# Patient Record
Sex: FEMALE | Race: BLACK OR AFRICAN AMERICAN | Employment: FULL TIME | URBAN - METROPOLITAN AREA
[De-identification: names, ages, dates, MRNs, and addresses within clinical notes are randomized per-mention and may not be internally consistent; named-entity substitution may affect disease eponyms.]

---
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
## 2022-10-22 ENCOUNTER — APPOINTMENT (OUTPATIENT)
Dept: GENERAL RADIOLOGY | Age: 22
End: 2022-10-22
Attending: EMERGENCY MEDICINE
Payer: COMMERCIAL

## 2022-10-22 ENCOUNTER — HOSPITAL ENCOUNTER (EMERGENCY)
Age: 22
Discharge: HOME | End: 2022-10-22
Attending: EMERGENCY MEDICINE
Payer: COMMERCIAL

## 2022-10-22 VITALS
SYSTOLIC BLOOD PRESSURE: 125 MMHG | BODY MASS INDEX: 23.11 KG/M2 | DIASTOLIC BLOOD PRESSURE: 89 MMHG | HEART RATE: 104 BPM | RESPIRATION RATE: 19 BRPM | HEIGHT: 69 IN | WEIGHT: 156 LBS | OXYGEN SATURATION: 100 % | TEMPERATURE: 98.8 F
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
## 2022-10-22 DIAGNOSIS — S82.832A OTHER CLOSED FRACTURE OF DISTAL END OF LEFT FIBULA, INITIAL ENCOUNTER: Primary | ICD-10-CM

## 2022-10-22 DIAGNOSIS — S82.892A CLOSED FRACTURE OF LEFT ANKLE, INITIAL ENCOUNTER: ICD-10-CM

## 2022-10-22 PROCEDURE — 99284 EMERGENCY DEPT VISIT MOD MDM: CPT

## 2022-10-22 PROCEDURE — 73610 X-RAY EXAM OF ANKLE: CPT

## 2022-10-22 PROCEDURE — 75810000275 HC EMERGENCY DEPT VISIT NO LEVEL OF CARE

## 2022-10-22 PROCEDURE — 75810000053 HC SPLINT APPLICATION

## 2022-10-22 PROCEDURE — 74011250637 HC RX REV CODE- 250/637: Performed by: EMERGENCY MEDICINE

## 2022-10-22 RX ORDER — OXYCODONE HYDROCHLORIDE 5 MG/1
5 TABLET ORAL
Qty: 10 TABLET | Refills: 0 | Status: SHIPPED | OUTPATIENT
Start: 2022-10-22 | End: 2022-10-25

## 2022-10-22 RX ORDER — OXYCODONE AND ACETAMINOPHEN 5; 325 MG/1; MG/1
2 TABLET ORAL
Status: COMPLETED | OUTPATIENT
Start: 2022-10-22 | End: 2022-10-22

## 2022-10-22 RX ADMIN — OXYCODONE HYDROCHLORIDE AND ACETAMINOPHEN 2 TABLET: 5; 325 TABLET ORAL at 06:31

## 2022-10-22 NOTE — Clinical Note
Loida Ac was seen and treated in our emergency department on 10/22/2022. Ms. Fabiola Oliva may return to work on 10/26/22 but on must remain non-weight bearing and on appropriately limited duty.     Selma Armando MD

## 2022-10-22 NOTE — ED NOTES
Patient provided with crutches at this time. Instructions and education on appropriate use provided to patient and patient's grandmother. Patient remains in bed, no demonstration at this time.
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
## 2022-10-22 NOTE — ED NOTES
Patient (s)  given copy of dc instructions and 1 paper script(s) and 0 electronic scripts. Patient (s)  verbalized understanding of instructions and script (s). Patient given a current medication reconciliation form and verbalized understanding of their medications. Patient (s) verbalized understanding of the importance of discussing medications with  his or her physician or clinic they will be following up with. Patient alert and oriented and in no acute distress. Patient offered wheelchair from treatment area to hospital entrance, attempted to use crutches but then patient given wheelchair.

## 2022-10-22 NOTE — ED NOTES
Bedside and Verbal shift change report given to DEL RN (oncoming nurse) by Jackson Alvarez RN  (offgoing nurse). Report included the following information SBAR, ED Summary, Intake/Output, MAR, and Recent Results.

## 2022-10-22 NOTE — DISCHARGE INSTRUCTIONS
-- Elevate your leg as much as possible  -- continue icing it and taking pain medication as needed.  If tylenol and ibuprofen do not help with pain sufficiently, you can take the pain medications we prescribed  --- remain non-weight bearing until you see an orthopedic specialist  -- review instructions in splint care and return precautions , which we discussed
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
